# Patient Record
Sex: FEMALE | Race: WHITE
[De-identification: names, ages, dates, MRNs, and addresses within clinical notes are randomized per-mention and may not be internally consistent; named-entity substitution may affect disease eponyms.]

---

## 2017-01-02 ENCOUNTER — HOSPITAL ENCOUNTER (EMERGENCY)
Dept: HOSPITAL 68 - ERH | Age: 42
LOS: 1 days | End: 2017-01-03
Payer: COMMERCIAL

## 2017-01-02 DIAGNOSIS — R07.9: Primary | ICD-10-CM

## 2017-01-02 DIAGNOSIS — R06.02: ICD-10-CM

## 2017-01-02 LAB
ABSOLUTE GRANULOCYTE CT: 5.7 /CUMM (ref 1.4–6.5)
BASOPHILS # BLD: 0 /CUMM (ref 0–0.2)
BASOPHILS NFR BLD: 0.3 % (ref 0–2)
EOSINOPHIL # BLD: 0.3 /CUMM (ref 0–0.7)
EOSINOPHIL NFR BLD: 3.3 % (ref 0–5)
ERYTHROCYTE [DISTWIDTH] IN BLOOD BY AUTOMATED COUNT: 12.7 % (ref 11.5–14.5)
GRANULOCYTES NFR BLD: 56.9 % (ref 42.2–75.2)
HCT VFR BLD CALC: 35.2 % (ref 37–47)
LYMPHOCYTES # BLD: 3.3 /CUMM (ref 1.2–3.4)
MCH RBC QN AUTO: 29.2 PG (ref 27–31)
MCHC RBC AUTO-ENTMCNC: 34 G/DL (ref 33–37)
MCV RBC AUTO: 85.9 FL (ref 81–99)
MONOCYTES # BLD: 0.6 /CUMM (ref 0.1–0.6)
PLATELET # BLD: 225 /CUMM (ref 130–400)
PMV BLD AUTO: 7.9 FL (ref 7.4–10.4)
RED BLOOD CELL CT: 4.1 /CUMM (ref 4.2–5.4)
WBC # BLD AUTO: 10 /CUMM (ref 4.8–10.8)

## 2017-01-02 NOTE — RADIOLOGY REPORT
EXAMINATION:
XR PORTABLE CHEST
 
CLINICAL INFORMATION:
Chest pain.
 
COMPARISON:
None.
 
TECHNIQUE:
Portable view of the chest was obtained.
 
FINDINGS:
The lungs are well-expanded and clear without focal airspace consolidation.
No pleural effusions or pneumothoraces are identified. Cardiomediastinal
contours are within normal limits. Soft tissues are unremarkable. No acute
osseous abnormality is identified.
 
IMPRESSION:
No acute cardiopulmonary abnormality.

## 2017-01-02 NOTE — CT SCAN REPORT
EXAMINATION:
CT ANGIOGRAM OF THE CHEST WITH AND WITHOUT CONTRAST (CT PULMONARY ANGIOGRAM
FOR PE)
 
CLINICAL INFORMATION:
Elevated d-dimer.
 
COMPARISON:
No pertinent prior studies are available for comparison.
 
TECHNIQUE:
Prior to contrast administration, noncontrast localization images were
obtained.   Subsequently, multidetector volumetric imaging was performed from
the thoracic inlet to below the diaphragms following the administration of 75
mL Optiray 320 intravenous contrast.
No contrast reaction reported
Sagittal, coronal, and MIP oblique sagittal reformatted images were obtained
on the CT workstation, uploaded to PACS, and reviewed.
Total exam dose-length product 226 mGy-cm
 
FINDINGS:
 
QUALITY OF STUDY/CONTRAST BOLUS: Satisfactory.
 
PULMONARY ARTERIES: No central or segmental pulmonary emboli.
 
THORACIC AORTA: No aneurysm or dissection.
 
LUNG: A calcified granuloma is present within the medial aspect of the left
upper lobe. No suspicious pulmonary nodules are identified. No consolidation,
pneumothorax, or emphysema. No bronchiectasis.
 
PLEURA: No pleural effusion or pneumothorax.
 
MEDIASTINUM: Normal heart size.  No pericardial effusion.  No hilar or
mediastinal lymphadenopathy.  No evidence of septal bowing or right heart
strain. Thymic tissue is present anteriorly.
 
CHEST WALL/AXILLA: No axillary or internal mammary lymphadenopathy.
 
OSSEOUS STRUCTURES: There is mild degenerative spondylosis in the thoracic
spine with minimal right convex thoracic curvature. No fracture or
malalignment. No osseous lesions are identified.
 
UPPER ABDOMEN: Unremarkable.  No reflux of contrast into the hepatic veins to
suggest elevated right heart pressures.
 
IMPRESSION:
1. No acute pulmonary findings. No evidence of pulmonary venous
thromboembolism.
2. 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
 
VTE: negative

## 2017-01-02 NOTE — ED CARDIAC/CP/PALPITATIONS
**See Addendum**
History of Present Illness
 
General
Chief Complaint: Chest Pain
Stated Complaint: CHEST HEAVINESS, INDIGESTION, NECK PAIN
Source: patient
Exam Limitations: no limitations
 
Vital Signs & Intake/Output
Vital Signs & Intake/Output
 Vital Signs
 
 
Date Time Temp Pulse Resp B/P Pulse O2 O2 Flow FiO2
 
     Ox Delivery Rate 
 
2322 97.5 58 12 106/68 100 Room Air  
 
 2200  80  110/74    
 
 2130      Room Air  
 
 2112  110 22 153/107    
 
 
 ED Intake and Output
 
 
  0000  1200
 
Intake Total 0 
 
Output Total  
 
Balance 0 
 
   
 
Intake, Oral 0 
 
Patient 130 lb 
 
Weight  
 
 
Allergies
Coded Allergies:
No Known Allergies (17)
 
Reconcile Medications
No Known Home Medications
 
Triage Note:
PER PT CP ON AND OFF X 3 DAYS PER PT TODAY SINCE
1900 TRIED TO TAKE A WARM SHOWER BUT HR 
PT REPORTS PAIN TO CHEST NECK RT SIDE INTO BACK
AND DOWN BOTH LEGS AND BILAT ARMS DENIES BCP OR
SMOKING LMP 10 DAYS AGO
Triage Nurses Notes Reviewed? yes
Onset: Gradual
Duration: waxing and waning
Quality/Severity: moderate
Location: substernal
Radiation: jaw, neck
Activities at Onset: none
Prior Chest Pain/Card Workup: no prior cardiac workup
Pregnant: No
Patient currently breastfeeds: No
HPI:
Patient is a 41-year-old female with an unremarkable past medical history who 
presents emergency room stating that for the past 3 days patient has had 
concerning waxing and waning symptoms of indigestion chest heaviness lightheaded
sensation and radiation of pain and paresthesia to her jaw and bilateral arms.  
Patient also had associated symptoms of shortness of breath.  Patient did have 
similar symptoms approximately 9 months ago followed up with primary care doctor
with unremarkable findings and EKG findings.  Patient states that the symptoms 
have waxed and waned and states that minimal activity does make worse.  Denies 
any history of oral contraceptive use PE or DVT, recent travel recent surgery 
hemoptysis leg swelling.  Patient has not taken any medications for symptoms.  
Denies any fever, chills, nausea vomiting.  Denies any illicit drug use denies 
any significant family history of cardiac death before the age of 50.  Denies 
any smoking tobacco or alcohol use
(PRESTON RUDD,RISHI)
 
Past History
 
Travel History
Traveled to [place] past 21 day No
 
Medical History
Any Pertinent Medical History? none
Neurological: NONE
EENT: NONE
Cardiovascular: NONE
Respiratory: NONE
Gastrointestinal: NONE
Hepatic: NONE
Renal: NONE
Musculoskeletal: NONE
Psychiatric: NONE
Endocrine: NONE
 
Surgical History
Surgical History: non-contributory
 
Psychosocial History
What is your primary language English
Tobacco Use: Never used
 
Family History
Hx Contributory? No
(RISHI GRADY)
 
Review of Systems
 
Review of Systems
Constitutional:
Reports: no symptoms. 
EENTM:
Reports: no symptoms. 
Respiratory:
Reports: see HPI, short of breath. 
Cardiovascular:
Reports: see HPI, chest pain. 
GI:
Reports: no symptoms. 
Genitourinary:
Reports: no symptoms. 
Musculoskeletal:
Reports: no symptoms. 
Skin:
Reports: no symptoms. 
Neurological/Psychological:
Reports: see HPI, paresthesia. 
Hematologic/Endocrine:
Reports: no symptoms. 
Immunologic/Allergic:
Reports: no symptoms. 
All Other Systems: Reviewed and Negative
(RISHI GRADY)
 
Physical Exam
 
Physical Exam
General Appearance: well developed/nourished, no apparent distress, alert
Cardiovascular: regular rate/rhythm
Comments:
Well-developed well-nourished person in no acute distress
HEENT: Normal EENT exam, extraocular motion intact, no nystagmus. Pupils equally
round and reactive to light and accommodation. Nose is atraumatic. External 
auditory canal and Tympanic membranes clear. Pharynx normal. No swelling or 
edema. 
Neck: Supple, no lymphadenopathy, normal range of motion without pain or 
tenderness
Back: Nontender, no CVA tenderness.
Cardiovascular: Regular rate and rhythms no murmurs rubs or gallops, normal JVP
Respiratory: Chest nontender. No respiratory distress.breath sounds clear to 
auscultation bilaterally
Abdomen: Soft, nontender nondistended, no appreciable organomegaly. Normal bowel
sounds. No ascites
Extremity: No edema, no calf tenderness to palpation, normal and equal pulses.
Neuro: Alert oriented x3, motor sensory normal, cranial nerves II through XII 
grossly intact.
Skin: No appreciable rash on exposed skin, skin is warm and dry.
Psych: Mood and affect is normal, memory and judgment is normal.
 
Core Measures
ACS in differential dx? Yes
Severe Sepsis Present: No
Septic Shock Present: No
(RISIH GRADY)
 
Progress
Differential Diagnosis: AMI, aortic dissection, atrial fibrillation, 
cholecystitis, CHF/pulm edema, costochondritis, hyperkalemia, hypovolemia, 
hyperthyroid, hyperventilation, intracranial hemorrhage, musculoskeletal pain, 
myocarditis, pancreatitis, pericarditis, pneumonia, pneumothorax, PSVT, 
pulmonary embolism, PUD/GERD, PVCs/PACs, respiratory failure, rib fracture, 
sepsis, unstable angina, V-fib/V-Tach, WPW syndrome
Plan of Care:
 Orders
 
 
Procedure Date/time Status
 
TROPONIN LEVEL 130 Active
 
 Active
 
TROPONIN LEVEL 2106 Complete
 
HUMAN BETA HCG SCREEN 2106 Complete
 
D-DIMER 2106 Complete
 
COMPREHENSIVE METABOLIC PANEL 2106 Complete
 
CBC WITHOUT DIFFERENTIAL 2106 Complete
 
EKG 2105 Active
 
 
 Laboratory Tests
 
 
 
17 0140:
Troponin I Pending
 
17:
Anion Gap 11, Estimated GFR > 60, BUN/Creatinine Ratio 18.6, Glucose 93, Calcium
9.2, Total Bilirubin 0.4, AST 14, ALT 17, Alkaline Phosphatase 39, Troponin I < 
0.01, Total Protein 7.2, Albumin 4.2, Globulin 3.0, Albumin/Globulin Ratio 1.4, 
Total Beta HCG NEGATIVE, D-Dimer 444  H, CBC w Diff NO MAN DIFF REQ, RBC 4.10  L
, MCV 85.9, MCH 29.2, RDW 12.7, MPV 7.9, Gran % 56.9, Lymphocytes % 33.2, 
Monocytes % 6.3, Eosinophils % 3.3, Basophils % 0.3, Absolute Granulocytes 5.7, 
Absolute Lymphocytes 3.3, Absolute Monocytes 0.6, Absolute Eosinophils 0.3, 
Absolute Basophils 0, PUBS MCHC 34.0
Upon initial examination patient was in no apparent distress however patient did
have concerning cardiovascular symptoms.  First set of cardiac enzymes was 
unremarkable on telemetry monitor noted to be normal sinus rhythm patient 
declined any medications when offered.  Patient had an unremarkable EKG and d-
dimer was elevated and which patient required CT scan angiogram showing no 
concern for pulmonary embolism.  Patient will require second set of cardiac 
enzymes.  Discussed disposition plan with patient and family and they agree.  I'
ll discuss handoff with Dr. Somers.
(RISHI GRADY)
Diagnostic Imaging:
Viewed by Me: Radiology Read, CT Scan. 
Radiology Impression: no acute abnormality
Initial ED EKG: SINUS RHYTHM NOTED AT 97 BPM
Hand-Off
   Endorsed To:
DONALD SOMERS MD
   Endorsed Time: 160
   Pending: labs
Comments:
 
PATIENT: RUSSELL WILKS  MEDICAL RECORD NO: [id number]
PRESENT AGE: 41  PATIENT ACCOUNT NO: [id number]
: 77  LOCATION: [facility]
ORDERING PHYSICIAN: RISHI RUDD  
 
  SERVICE DATE: 
EXAM TYPE: CAT - CTA CHEST-PULMONARY EMBOLISM
 
EXAMINATION:
CT ANGIOGRAM OF THE CHEST WITH AND WITHOUT CONTRAST (CT PULMONARY ANGIOGRAM
FOR PE)
 
CLINICAL INFORMATION:
Elevated d-dimer.
 
COMPARISON:
No pertinent prior studies are available for comparison.
 
TECHNIQUE:
Prior to contrast administration, noncontrast localization images were
obtained.   Subsequently, multidetector volumetric imaging was performed from
the thoracic inlet to below the diaphragms following the administration of 75
mL Optiray 320 intravenous contrast.
No contrast reaction reported
Sagittal, coronal, and MIP oblique sagittal reformatted images were obtained
on the CT workstation, uploaded to PACS, and reviewed.
Total exam dose-length product 226 mGy-cm
 
FINDINGS:
 
QUALITY OF STUDY/CONTRAST BOLUS: Satisfactory.
 
PULMONARY ARTERIES: No central or segmental pulmonary emboli.
 
THORACIC AORTA: No aneurysm or dissection.
 
LUNG: A calcified granuloma is present within the medial aspect of the left
upper lobe. No suspicious pulmonary nodules are identified. No consolidation,
pneumothorax, or emphysema. No bronchiectasis.
 
PLEURA: No pleural effusion or pneumothorax.
 
MEDIASTINUM: Normal heart size.  No pericardial effusion.  No hilar or
mediastinal lymphadenopathy.  No evidence of septal bowing or right heart
strain. Thymic tissue is present anteriorly.
 
CHEST WALL/AXILLA: No axillary or internal mammary lymphadenopathy.
 
OSSEOUS STRUCTURES: There is mild degenerative spondylosis in the thoracic
spine with minimal right convex thoracic curvature. No fracture or
malalignment. No osseous lesions are identified.
 
UPPER ABDOMEN: Unremarkable.  No reflux of contrast into the hepatic veins to
suggest elevated right heart pressures.
 
IMPRESSION:
1. No acute pulmonary findings. No evidence of pulmonary venous
thromboembolism.
2. 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
 
VTE: negative
PATIENT: RUSSELL WILKS  MEDICAL RECORD NO: [id number]
PRESENT AGE: 41  PATIENT ACCOUNT NO: [id number]
: 77  LOCATION: [facility]
ORDERING PHYSICIAN: DONALD SOMERS MD  
 
  SERVICE DATE: 
EXAM TYPE: RAD - XRY-PORTABLE CHEST XRAY
 
EXAMINATION:
XR PORTABLE CHEST
 
CLINICAL INFORMATION:
Chest pain.
 
COMPARISON:
None.
 
TECHNIQUE:
Portable view of the chest was obtained.
 
FINDINGS:
The lungs are well-expanded and clear without focal airspace consolidation.
No pleural effusions or pneumothoraces are identified. Cardiomediastinal
contours are within normal limits. Soft tissues are unremarkable. No acute
osseous abnormality is identified.
 
IMPRESSION:
No acute cardiopulmonary abnormality.
 
(RISHI GRADY)
 
Departure
 
Departure
Disposition: HOME OR SELF CARE
Condition: Stable
Referrals:
TUNDE ALANIS,NEISHA PERKINS (PCP/Family)
 
Referred to [facility] as new patient No
Additional Instructions:
As discussed first thing tomorrow please follow up and establish Dr. Guajardo for 
cardiology evaluation.  If symptoms worsen or if you develop a new concerning 
symptom return to the emergency room .
Departure Forms:
Customer Survey
General Discharge Information
Prescriptions:
Current Visit Scripts
No Known Home Medications    
 
(RISHI GRADY)
 
Departure
Clinical Impression
Primary Impression: Chest pain
Secondary Impressions: Shortness of breath
 
PA/NP Co-Sign Statement
Statement:
ED Attending supervision documentation-
 
[] I saw and evaluated the patient. I have also reviewed all the pertinent lab 
results and diagnostic results. I agree with the findings and the plan of care 
as documented in the PA's/NP's documentation. 
 
[x] I have reviewed the ED Record and agree with the PA's/NP's documentation.
 
[] Additions or exceptions (if any) to the PAs/NP's note and plan are 
summarized below:
[]
 
(OSWALDO ALANIS,DONALD MCCABE)
 
Critical Care Note
 
Critical Care Note
Critical Care Time: non-applicable
(RISHI GRADY)

## 2017-01-03 VITALS — SYSTOLIC BLOOD PRESSURE: 111 MMHG | DIASTOLIC BLOOD PRESSURE: 74 MMHG

## 2018-06-06 ENCOUNTER — HOSPITAL ENCOUNTER (EMERGENCY)
Dept: HOSPITAL 68 - ERH | Age: 43
End: 2018-06-06
Payer: COMMERCIAL

## 2018-06-06 VITALS — DIASTOLIC BLOOD PRESSURE: 78 MMHG | SYSTOLIC BLOOD PRESSURE: 122 MMHG

## 2018-06-06 VITALS — BODY MASS INDEX: 19.78 KG/M2 | HEIGHT: 67 IN | WEIGHT: 126 LBS

## 2018-06-06 DIAGNOSIS — R07.89: Primary | ICD-10-CM

## 2018-06-06 LAB
ABSOLUTE GRANULOCYTE CT: 6.3 /CUMM (ref 1.4–6.5)
BASOPHILS # BLD: 0 /CUMM (ref 0–0.2)
BASOPHILS NFR BLD: 0 % (ref 0–2)
EOSINOPHIL # BLD: 0.1 /CUMM (ref 0–0.7)
EOSINOPHIL NFR BLD: 1.1 % (ref 0–5)
ERYTHROCYTE [DISTWIDTH] IN BLOOD BY AUTOMATED COUNT: 13.3 % (ref 11.5–14.5)
GRANULOCYTES NFR BLD: 80.5 % (ref 42.2–75.2)
HCT VFR BLD CALC: 38.1 % (ref 37–47)
LYMPHOCYTES # BLD: 1.1 /CUMM (ref 1.2–3.4)
MCH RBC QN AUTO: 29.4 PG (ref 27–31)
MCHC RBC AUTO-ENTMCNC: 34.2 G/DL (ref 33–37)
MCV RBC AUTO: 86 FL (ref 81–99)
MONOCYTES # BLD: 0.3 /CUMM (ref 0.1–0.6)
PLATELET # BLD: 246 /CUMM (ref 130–400)
PMV BLD AUTO: 8.2 FL (ref 7.4–10.4)
RED BLOOD CELL CT: 4.44 /CUMM (ref 4.2–5.4)
WBC # BLD AUTO: 7.8 /CUMM (ref 4.8–10.8)

## 2018-06-06 NOTE — ED GENERAL ADULT
History of Present Illness
 
General
Chief Complaint: General Adult
Stated Complaint: JAW/NECK PAIN, HEART RACING AT TIMES X3DAYS
Source: patient, family
Exam Limitations: no limitations
 
Vital Signs & Intake/Output
Vital Signs & Intake/Output
 Vital Signs
 
 
Date Time Temp Pulse Resp B/P B/P Pulse O2 O2 Flow FiO2
 
     Mean Ox Delivery Rate 
 
06/06 1500 98.0 64 18 122/78  98 Room Air  
 
06/06 1223 97.5 98 15 127/89  97 Room Air Room Air 
 
 
 
Allergies
Coded Allergies:
No Known Allergies (06/06/18)
 
Reconcile Medications
No Known Home Medications
 
Triage Note:
PT TO ED FOR C/C OF L JAW PAIN THAT RADIATES TO L
 SIDE OF NECK AND INTO UPPER BACK IN BETWEEN
 SHOULDER BLADES.  SINCE FRIDAY PT HAS HAD FATIGUE
 AND MONDAY SOME NAUSEA AND THIS MORNING.  ALSO
 REPORTS LIGHTHEADEDNESS AND SOME INTERMITTENT SOB.
 DENIES SMOKING OR SIGNIFICANT FAMILY HISTORY, NOT
 ON BIRTH CONTROL.
Triage Nurses Notes Reviewed? yes
Pregnant: No
Patient currently breastfeeds: No
HPI:
Patient is a 42-year-old female with no significant past medical history who 
presents today with chest pain.  She has a family history of ITP but no ischemic
cardiac disease.  She had a similar episode in January 2017 at which time her ED
workup including ECG and troponin were unremarkable, and she followed up with 
cardiology (Dr. Clifton) and underwent outpatient stress testing also with 
negative results.  In regards to this episode, she reports that 5 days ago she 
spirits relatively acute onset of profound fatigue, which in the days to follow 
developed into central chest pressure, nausea, palpitations, and then today left
-sided jaw tightness and palpitations.  Triage ECG is nonischemic.
 
Past History
 
Travel History
Traveled to [place] past 21 day No
 
Medical History
Any Pertinent Medical History? see below for history
Neurological: NONE
EENT: NONE
Cardiovascular: NONE
Respiratory: NONE
Gastrointestinal: NONE
Hepatic: NONE
Renal: NONE
Musculoskeletal: NONE
Psychiatric: NONE
Endocrine: NONE
Blood Disorders: NONE
Cancer(s): NONE
GYN/Reproductive: NONE
 
Surgical History
Surgical History: non-contributory
 
Psychosocial History
What is your primary language English
Tobacco Use: Never used
ETOH Use: denies use
Illicit Drug Use: denies illicit drug use
 
Family History
Family History, If Any:
MOTHER
Relation not specified for:
  Idiopathic thrombocytopenic purpura
 
Hx Contributory? Yes
 
Review of Systems
 
Review of Systems
Constitutional:
Reports: see HPI. 
EENTM:
Reports: no symptoms. 
Respiratory:
Reports: see HPI, short of breath. 
Cardiovascular:
Reports: see HPI, chest pain. 
GI:
Reports: see HPI, nausea. 
Genitourinary:
Reports: no symptoms. 
Musculoskeletal:
Reports: neck pain. 
Skin:
Reports: no symptoms. 
Neurological/Psychological:
Reports: no symptoms. 
Hematologic/Endocrine:
Reports: no symptoms. 
Immunologic/Allergic:
Reports: no symptoms. 
All Other Systems: Reviewed and Negative
 
Physical Exam
 
Physical Exam
General Appearance: well developed/nourished, no apparent distress, alert, 
anxious, comfortable
Comments:
HEENT: Inspection of the head reveals a normocephalic cranium with no signs of 
trauma.
Ophtho: Extraocular muscles are intact and pupils are equal and reactive to 
light bilaterally with no afferent pupillary defect.  The sclera are noninjected
, and there is no obvious discharge.
Neck: The trachea is midline, there is no obvious asymmetry or mass over the 
thyroid, and there is no midline cervical spine tenderness
Respiratory: The lungs are clear and equal to auscultation bilaterally without 
wheezes, rales, or rhonchi.  The patient exhibits no signs of labored breathing.
Cardiac: Regular rhythm and non-tachycardic without appreciable murmurs on 
auscultation.  No obvious JVD.
GI: Examination of the abdomen reveals no significant focal tenderness in any of
the four quadrants.  There is negative Fontanez's sign, negative McBurney's point 
tenderness, negative Yosvany sign, negative Grey-Boston sign, and no signs of 
peritonitis whatsoever on percussion or deep palpation.  The skin is intact with
no sign of trauma or infection.
: Deferred
Neuro: The patient is oriented to person, place, time, and situation, with no 
obvious focal motor deficits.  There were no sensory deficits, and the patient 
exhibit purposeful movement of all 4 extremities.  Cranial nerves II through XII
are intact, and gait is normal.
Behavioral: Calm and cooperative
Dermatologic: Dermatologic examination reveals no diffuse rashes or exanthems, 
no petechiae, no ecchymoses, and no other signs of erythema or infection.
 
Core Measures
ACS in differential dx? Yes
CVA/TIA Diagnosis: No
Sepsis Present: No
Sepsis Focused Exam Completed? No
 
Progress
Differential Diagnoses
I considered the following diagnoses in my evaluation of the patient: Acute 
coronary syndrome, Prinzmetal angina, unstable angina, stable angina, pneumonia,
pleurisy, upper respiratory infection, among multiple other possibilities.
 
Plan of Care:
 Orders
 
 
Procedure Date/time Status
 
TROPONIN LEVEL 06/06 1231 Complete
 
COMPREHENSIVE METABOLIC PANEL 06/06 1231 Complete
 
CBC WITHOUT DIFFERENTIAL 06/06 1231 Complete
 
EKG 06/06 1210 Active
 
 
 Laboratory Tests
 
 
 
06/06/18 1247:
Anion Gap 13, Estimated GFR > 60, BUN/Creatinine Ratio 12.9, Glucose 87, Calcium
9.5, Total Bilirubin 0.5, AST 16, ALT 24, Alkaline Phosphatase 47, Troponin I < 
0.01, Total Protein 7.0, Albumin 4.2, Globulin 2.8, Albumin/Globulin Ratio 1.5, 
CBC w Diff NO MAN DIFF REQ, RBC 4.44, MCV 86.0, MCH 29.4, MCHC 34.2, RDW 13.3, 
MPV 8.2, Gran % 80.5  H, Lymphocytes % 14.0  L, Monocytes % 4.4, Eosinophils % 
1.1, Basophils % 0, Absolute Granulocytes 6.3, Absolute Lymphocytes 1.1  L, 
Absolute Monocytes 0.3, Absolute Eosinophils 0.1, Absolute Basophils 0
 
CXR Impression: no acute abnormality, no infiltrates, normal size heart, normal 
mediastinum
Initial ED EKG: ECG performed at 1237 hrs., read at 1240 hrs. by me, normal 
sinus rhythm with rate of 81 bpm, normal axis, [place]/QRS/QTc intervals normal, T 
waves normal, no ST segment changes, no STEMI.  As compared to prior study 
performed on 01/03/2017, there are no new ischemic changes.
Comments:
Patient presented today for atypical chest discomfort.  This was associated with
left ear/jaw pain, which initially was concerning for sequelae from ACS.  
However, the patient also reports sinus congestion and states that the 
discomfort does feel to be in the area of her eustachian tube and feels that her
left ear needs to pop.  She has had the pain for several hours, and her workup 
in the ED found that her troponin was negative, and her ECG was unremarkable.  
Her chest x-ray was also clear.  I offered hospitalization for cardiac 
observation albeit for unlikely acute coronary syndrome.  I also offered a 
repeat troponin at 3 hours to bring the patient's risk from between 1 and 3% 
down to less than 1% in accordance with the HEART pathway.  She declined my 
offer and chose to be discharged home as she is feeling improved and reassured. 
She will follow-up with Dr. Clifton from cardiology for reassessment, and she 
understands the need to return to the emergency department for any new or 
worsening symptoms.
 
Departure
 
Departure
Time of Disposition: 1539
Disposition: HOME OR SELF CARE
Condition: Stable
Clinical Impression
Primary Impression: Chest pain
Qualifiers:  Chest pain type: unspecified Qualified Code: R07.9 - Chest pain, 
unspecified
Referrals:
Hammad ALANIS,Sarbjit PERKINS (PCP/Family)
 
Additional Instructions:
Please make an appointment to follow-up with Dr. Clifton from cardiology for 
reassessment.  As discussed, return to the emergency department immediately for 
any new or worsening symptoms that concern you.  Your blood work, EKG, and chest
x-ray were normal in the emergency department today, but this of course does not
represent a full cardiac rule out.  It is extremely important that you follow-up
with her primary physician and with Dr. Clifton for further concerns.  Your 
platelets today were 246.
Departure Forms:
Customer Survey
General Discharge Information
Prescriptions:
Current Visit Scripts
No Known Home Medications     
 
 
Critical Care Note
 
Critical Care Note
Critical Care Time: non-applicable

## 2018-06-06 NOTE — RADIOLOGY REPORT
EXAMINATION:
CHEST 2 VIEWS
 
CLINICAL INFORMATION:
Chest pain.
 
COMPARISON:
01/02/2017.
 
TECHNIQUE:
PA and lateral views of the chest were obtained.
 
FINDINGS:
The cardiac silhouette is not enlarged. The mediastinal and hilar contours
are unremarkable. There are neither pleural effusions nor pneumothoraces.
There are no consolidations. The osseous structures are stable with mild
rightward curvature to the lower thoracic spine.
 
IMPRESSION:
No evidence for acute disease.